# Patient Record
Sex: FEMALE | Race: WHITE | URBAN - METROPOLITAN AREA
[De-identification: names, ages, dates, MRNs, and addresses within clinical notes are randomized per-mention and may not be internally consistent; named-entity substitution may affect disease eponyms.]

---

## 2024-03-12 ENCOUNTER — PROCEDURE VISIT (OUTPATIENT)
Dept: SURGERY | Age: 69
End: 2024-03-12
Payer: MEDICARE

## 2024-03-12 VITALS — SYSTOLIC BLOOD PRESSURE: 161 MMHG | HEART RATE: 73 BPM | DIASTOLIC BLOOD PRESSURE: 106 MMHG

## 2024-03-12 DIAGNOSIS — C44.41 BASAL CELL CARCINOMA (BCC) OF SCALP: Primary | ICD-10-CM

## 2024-03-12 PROCEDURE — 17311 MOHS 1 STAGE H/N/HF/G: CPT | Performed by: DERMATOLOGY

## 2024-03-12 PROCEDURE — 12032 INTMD RPR S/A/T/EXT 2.6-7.5: CPT | Performed by: DERMATOLOGY

## 2024-03-12 RX ORDER — DILTIAZEM HYDROCHLORIDE 300 MG/1
300 CAPSULE, COATED, EXTENDED RELEASE ORAL DAILY
COMMUNITY
Start: 2023-11-09

## 2024-03-12 RX ORDER — ALENDRONATE SODIUM 70 MG/1
70 TABLET ORAL
COMMUNITY
Start: 2024-01-29

## 2024-03-12 RX ORDER — SPIRONOLACTONE 25 MG/1
25 TABLET ORAL DAILY
COMMUNITY
Start: 2024-01-31

## 2024-03-12 NOTE — PROGRESS NOTES
PRE-PROCEDURE SCREENING    Pacemaker/ICD: No  Difficulty with numbing in the past: No  Local Anesthesia Reaction/passing out: No  Latex or adhesive allergy:  No  Bleeding/Clotting Disorders: No  Anticoagulant Therapy: No  Joint prosthesis: No  Artificial Heart Valve: No  Stroke or Seizures: No  Organ Transplant or Lymphoma: No  Immunosuppression: No  Respiratory Problems: No  
dead space and dermis were closed using 4-0 Vicryl buried subcutaneous interrupted suture and the epidermis was approximated with 4-0 Prolene interrupted epidermal sutures.     .    WOUND COVERAGE:  The wound was cleaned with normal saline solution, dried off, Aquaphor ointment was applied, and the wound was covered.  A dressing was applied for stabilization and light pressure.  The patient was given detailed oral and written instructions on postoperative care.     There were no complications.  The patient left the Unit in good medical condition.     FOLLOW-UP:  The patient will return for suture removal in 14-21 days.

## 2024-03-12 NOTE — PATIENT INSTRUCTIONS
pressure for another 20 minutes.  If the bleeding does not stop after you apply pressure, call us right away. If you can not call, go to the nearest emergency room or urgent care facility.    What to expect:  You may have these symptoms. They are normal and should get better with time:  Swelling. Swelling usually increases for the first 48 hours after your procedure and then begins to improve. Some soreness and redness around your wound. If we worked close to your eyes (forehead, nose, temple, or upper cheeks) your eyes may become swollen and/ or black and blue.   Bruising, which could last 1 week or more.  Pink and bumpy appearance to the scar. This may happen a few weeks after your procedure. After 4 weeks, you may gently massage the area each day with facial moisturizer or petroleum jelly (Vaseline or Aquaphor). This will help to smooth the skin and improve the appearance of the scar. The color of your scar will fade over time, but it may be pink for several months after the procedure. The scar may take 6 months to 1 year to reach its final color and appearance.   \"Spitting\" suture. Occasionally, an inside suture (stitch) does not completely dissolve. When this happens, (generally 4-8 weeks after surgery), it causes a bump or \"pimple\" to form on the scar. This is easily removed and is not at all serious. It does not mean the skin cancer has returned. Contact us if it happens, but do not be alarmed.    Vitamin E oil is NOT necessary. A good moisturizer is just as effective.   Sunscreen IS necessary. Use at least and SPF 30 sunscreen daily- even in winter    Call us at [phone] right away if you have any of the following symptoms:  -Bleeding that you can not stop (see highlighted area above).  -Pain that lasts longer than 48 hours.  -Your wound becomes more painful, red or hot.  -Swelling that does not begin to improve within the 48 hours or gets worse suddenly.

## 2024-03-13 ENCOUNTER — TELEPHONE (OUTPATIENT)
Dept: SURGERY | Age: 69
End: 2024-03-13

## 2024-03-13 NOTE — TELEPHONE ENCOUNTER
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  The patient tolerated the procedure well and left the office in good condition.    Pain level on post-operative day 1:  none - but did take Tylenol last evening & again today as a preventative and is going to continue through tomorrow.  Doing very well per PT.    Any bleeding episode that required pressure to be held, bandage change or a call to the office or MD?  no     Any other issues?:  no    A post-operative telephone call was placed at 12:20p, 3/13/2024, in order to check on the patient's recovery process.  The patient reported doing well and had no complaints other than those listed above, if any.  All of the patient's questions were answered. Advised to call w/any questions/concerns.

## 2024-03-29 ENCOUNTER — NURSE ONLY (OUTPATIENT)
Dept: SURGERY | Age: 69
End: 2024-03-29

## 2024-03-29 DIAGNOSIS — Z48.02 VISIT FOR SUTURE REMOVAL: Primary | ICD-10-CM

## 2024-03-29 NOTE — PROGRESS NOTES
S:  The patient is here for suture removal s/p Mohs surgery on the right parietal scalp and Intermediate layered closure repair, 2 week(s) ago. The site appears well-healed without signs of infection (redness, pain or discharge). The sutures were removed.  Wound care and activity instructions given.  The patient was scheduled for follow-up PRN for scar/wound check.  The patient was scheduled for f/u with General Dermatology per their instructions.

## 2024-03-29 NOTE — PATIENT INSTRUCTIONS
[facility] Office Hours:    Monday-Thursday  7:30 AM-4:30 PM    Friday  9:00 AM-1:00 PM    WOUND CARE AFTER SUTURE REMOVAL    After your stiches have been removed, your scar is still very fragile. In fact, scars continue to change and evolve, what we call remodel, for about a year after your procedure.  Follow the following steps below to ensure that your scar heals well.    Instructions    1. If Steri-strips were applied, keep them on until they fall off on their own.  2. Protect your scar from the sun. Use a sunscreen or bandage to cover your scar. Sun exposure can cause your scar to become discolored and appear red or brown.  3. To help soften your scar more rapidly, it is helpful, but not necessary, for you to   massage the scar gently each night for twenty minutes.   4. “Spitting” suture. Occasionally, an inside suture (stitch) does not completely dissolve. When this happens, (generally 4-8 weeks after surgery), it causes a bump or “pimple” to form on the scar. This is easily removed and is not at all serious. It does not mean the skin cancer has returned. Contact us if it happens, but do not be alarmed.      5. If you scar becomes tender, itchy or becomes very large, let Dr. Zimmerman know.  There are treatments that can improve the appearance of your scar or help make it more comfortable.